# Patient Record
Sex: MALE | Race: WHITE | Employment: FULL TIME | URBAN - METROPOLITAN AREA
[De-identification: names, ages, dates, MRNs, and addresses within clinical notes are randomized per-mention and may not be internally consistent; named-entity substitution may affect disease eponyms.]

---

## 2017-06-25 ENCOUNTER — HOSPITAL ENCOUNTER (EMERGENCY)
Age: 42
Discharge: HOME | End: 2017-06-25
Attending: EMERGENCY MEDICINE
Payer: OTHER GOVERNMENT

## 2017-06-25 VITALS
HEART RATE: 67 BPM | TEMPERATURE: 97.7 F | OXYGEN SATURATION: 98 % | WEIGHT: 200 LBS | HEIGHT: 69 IN | BODY MASS INDEX: 29.62 KG/M2 | RESPIRATION RATE: 18 BRPM | DIASTOLIC BLOOD PRESSURE: 59 MMHG | SYSTOLIC BLOOD PRESSURE: 104 MMHG

## 2017-06-25 DIAGNOSIS — M51.36 DEGENERATIVE DISC DISEASE, LUMBAR: ICD-10-CM

## 2017-06-25 DIAGNOSIS — S39.012A LUMBAR STRAIN, INITIAL ENCOUNTER: Primary | ICD-10-CM

## 2017-06-25 PROCEDURE — 74011250637 HC RX REV CODE- 250/637: Performed by: EMERGENCY MEDICINE

## 2017-06-25 PROCEDURE — 74011250636 HC RX REV CODE- 250/636: Performed by: EMERGENCY MEDICINE

## 2017-06-25 PROCEDURE — 96372 THER/PROPH/DIAG INJ SC/IM: CPT

## 2017-06-25 PROCEDURE — 99283 EMERGENCY DEPT VISIT LOW MDM: CPT

## 2017-06-25 RX ORDER — CYCLOBENZAPRINE HCL 10 MG
10 TABLET ORAL
Qty: 12 TAB | Refills: 0 | Status: SHIPPED | OUTPATIENT
Start: 2017-06-25

## 2017-06-25 RX ORDER — METHOCARBAMOL 500 MG/1
750 TABLET, FILM COATED ORAL
Status: DISCONTINUED | OUTPATIENT
Start: 2017-06-25 | End: 2017-06-25

## 2017-06-25 RX ORDER — MONTELUKAST SODIUM 10 MG/1
10 TABLET ORAL DAILY
COMMUNITY

## 2017-06-25 RX ORDER — PANTOPRAZOLE SODIUM 40 MG/1
40 TABLET, DELAYED RELEASE ORAL DAILY
COMMUNITY

## 2017-06-25 RX ORDER — CYCLOBENZAPRINE HCL 10 MG
10 TABLET ORAL
Status: COMPLETED | OUTPATIENT
Start: 2017-06-25 | End: 2017-06-25

## 2017-06-25 RX ORDER — ALBUTEROL SULFATE 90 UG/1
AEROSOL, METERED RESPIRATORY (INHALATION)
COMMUNITY

## 2017-06-25 RX ORDER — CYCLOBENZAPRINE HCL 10 MG
10 TABLET ORAL
COMMUNITY
End: 2017-06-25

## 2017-06-25 RX ORDER — KETOROLAC TROMETHAMINE 30 MG/ML
60 INJECTION, SOLUTION INTRAMUSCULAR; INTRAVENOUS
Status: COMPLETED | OUTPATIENT
Start: 2017-06-25 | End: 2017-06-25

## 2017-06-25 RX ORDER — HYDROCODONE BITARTRATE AND ACETAMINOPHEN 5; 325 MG/1; MG/1
1 TABLET ORAL
Qty: 10 TAB | Refills: 0 | Status: SHIPPED | OUTPATIENT
Start: 2017-06-25

## 2017-06-25 RX ORDER — MELOXICAM 7.5 MG/1
7.5 TABLET ORAL DAILY
COMMUNITY

## 2017-06-25 RX ORDER — HYDROCODONE BITARTRATE AND ACETAMINOPHEN 5; 325 MG/1; MG/1
1 TABLET ORAL ONCE
Status: COMPLETED | OUTPATIENT
Start: 2017-06-25 | End: 2017-06-25

## 2017-06-25 RX ADMIN — KETOROLAC TROMETHAMINE 60 MG: 60 INJECTION, SOLUTION INTRAMUSCULAR at 10:01

## 2017-06-25 RX ADMIN — HYDROCODONE BITARTRATE AND ACETAMINOPHEN 1 TABLET: 5; 325 TABLET ORAL at 10:00

## 2017-06-25 RX ADMIN — CYCLOBENZAPRINE HYDROCHLORIDE 10 MG: 10 TABLET, FILM COATED ORAL at 10:00

## 2017-06-25 NOTE — ED NOTES
I have reviewed discharge instructions with the patient. The patient verbalized understanding. Patient armband removed and shredded. Discharge medications reviewed with patient and appropriate educational materials and side effects teaching were provided.

## 2017-06-25 NOTE — ED PROVIDER NOTES
HPI Comments: 9:39 AM Arabella Ramos is a 46 y.o. Male with a hx of lumbar DDD who presents to the ED c/o lower back pain that started last night. The pain started after exercising. The pain is worsened with twisting and bending. He tried applying heat to his back with no relief. He has had similar pain in the past due to his DDD. He denies incontinence, trauma, radiation of the pain into the lower extremities, numbness in the lower extremities, fever, and any further complaints. The history is provided by the patient. Past Medical History:   Diagnosis Date    Asthma        Past Surgical History:   Procedure Laterality Date    HX CHOLECYSTECTOMY      HX OTHER SURGICAL      vasectomy    HX OTHER SURGICAL      hernia         History reviewed. No pertinent family history. Social History     Social History    Marital status:      Spouse name: N/A    Number of children: N/A    Years of education: N/A     Occupational History    Not on file. Social History Main Topics    Smoking status: Never Smoker    Smokeless tobacco: Former User     Quit date: 6/25/2017    Alcohol use 1.8 oz/week     3 Cans of beer per week      Comment: per week    Drug use: No    Sexual activity: Yes     Other Topics Concern    Not on file     Social History Narrative    No narrative on file         ALLERGIES: Penicillins    Review of Systems   Constitutional: Negative for fever. HENT: Negative for sore throat. Eyes: Negative for redness and visual disturbance. Respiratory: Negative for shortness of breath and wheezing. Cardiovascular: Negative for chest pain. Gastrointestinal: Negative for abdominal pain and nausea. Endocrine: Negative for polyuria. Genitourinary: Negative for dysuria. Musculoskeletal: Positive for back pain. Negative for arthralgias and neck stiffness. Skin: Negative for rash. Neurological: Negative for numbness and headaches.         Negative for incontinence   All other systems reviewed and are negative. Vitals:    06/25/17 0853   BP: 104/59   Pulse: 67   Resp: 18   Temp: 97.7 °F (36.5 °C)   SpO2: 98%   Weight: 90.7 kg (200 lb)   Height: 5' 9\" (1.753 m)            Physical Exam   Constitutional: He is oriented to person, place, and time. He appears well-developed and well-nourished. No distress. HENT:   Head: Normocephalic and atraumatic. Mouth/Throat: Oropharynx is clear and moist.   Eyes: Conjunctivae are normal. Pupils are equal, round, and reactive to light. No scleral icterus. Neck: Normal range of motion. Neck supple. Cardiovascular: Intact distal pulses. Capillary refill < 3 seconds   Pulmonary/Chest: Effort normal and breath sounds normal. No respiratory distress. He has no wheezes. Abdominal: Soft. Bowel sounds are normal. He exhibits no distension. There is no tenderness. Musculoskeletal: Normal range of motion. He exhibits no edema. Points to bilateral lumbar paraspinal back as the area of pain. No midline tenderness or pain to palpation  Certain movements exacerbate the pain       Lymphadenopathy:     He has no cervical adenopathy. Neurological: He is alert and oriented to person, place, and time. No cranial nerve deficit. Sensation intact  Strength 5/5   Ambulating   Skin: Skin is warm and dry. He is not diaphoretic. Nursing note and vitals reviewed. MDM  Number of Diagnoses or Management Options  Degenerative disc disease, lumbar:   Lumbar strain, initial encounter:   Diagnosis management comments: ddx strain, spasm, DDD    No sign of cord compression  No trauma, I dont feel any imaging needed at this time. Gave analgesia in ED (toradol, flexeril, norco)    I have reassessed the patient. I have discussed the workup, results and plan with the patient and patient is in agreement. Patient will be prescribed norco.  Patient was discharge in stable condition. Patient was given outpatient follow up.   Patient is to return to emergency department if any new or worsening condition. Amount and/or Complexity of Data Reviewed  Tests in the medicine section of CPT®: ordered and reviewed    Risk of Complications, Morbidity, and/or Mortality  Presenting problems: moderate    Patient Progress  Patient progress: improved    ED Course       Procedures  Vitals:  Patient Vitals for the past 12 hrs:   Temp Pulse Resp BP SpO2   06/25/17 0853 97.7 °F (36.5 °C) 67 18 104/59 98 %     Pulse ox reviewed and WNL    Medications ordered:   Medications   ketorolac tromethamine (TORADOL) 60 mg/2 mL injection 60 mg (not administered)   HYDROcodone-acetaminophen (NORCO) 5-325 mg per tablet 1 Tab (not administered)   cyclobenzaprine (FLEXERIL) tablet 10 mg (not administered)         Progress notes, Consult notes or additional Procedure notes:   9:45 AM  Discussed findings, as well as, diagnosis and plan for discharge. Pt verbalizes understanding and agreement with plan. All questions addressed at this time. Disposition:  Diagnosis:   1. Lumbar strain, initial encounter    2. Degenerative disc disease, lumbar        Disposition: Discharge    Follow-up Information     Follow up With Details Comments Contact Info    Your regular doctor in [place]. Call in 2 days      [facility]  As needed, If symptoms worsen [address]            Patient's Medications   Start Taking    HYDROCODONE-ACETAMINOPHEN (NORCO) 5-325 MG PER TABLET    Take 1 Tab by mouth every six (6) hours as needed for Pain. Max Daily Amount: 4 Tabs. Continue Taking    ALBUTEROL (PROVENTIL HFA, VENTOLIN HFA, PROAIR HFA) 90 MCG/ACTUATION INHALER    Take  by inhalation every six (6) hours as needed for Wheezing. MELOXICAM (MOBIC) 7.5 MG TABLET    Take 7.5 mg by mouth daily. MONTELUKAST (SINGULAIR) 10 MG TABLET    Take 10 mg by mouth daily. PANTOPRAZOLE (PROTONIX) 40 MG TABLET    Take 40 mg by mouth daily.    These Medications have changed    Modified Medication Previous Medication    CYCLOBENZAPRINE (FLEXERIL) 10 MG TABLET cyclobenzaprine (FLEXERIL) 10 mg tablet       Take 1 Tab by mouth three (3) times daily as needed for Muscle Spasm(s). Indications: MUSCLE SPASM    Take 10 mg by mouth three (3) times daily as needed for Muscle Spasm(s). Stop Taking    No medications on file         SCRIBE ATTESTATION STATEMENT  Documented by: Magnolia Zimmerman for, and in the presence of, Gurmeet Frank DO 9:45 AM    Signed by: Joanna Stanley, 06/25/17 9:45 AM    PROVIDER ATTESTATION STATEMENT  I personally performed the services described in the documentation, reviewed the documentation, as recorded by the scribe in my presence, and it accurately and completely records my words and actions.   Gurmeet Frank DO

## 2017-06-25 NOTE — LETTER
[address] [place] [address] [facility] 
[address] [facility] [id number] 
[phone] Work/School Note Date: 6/25/2017 To Whom It May concern: 
 
Zhao Singh was seen and treated today in the emergency room by the following provider(s): 
Attending Provider: Jonathan Smith Special Instructions:  Bedrest for today 6/25/17 and light duty on 06/26/2017. Sincerely, Leon Sanchez RN

## 2017-06-25 NOTE — ED TRIAGE NOTES
Pt. C/o lower back pain since yest.  has hx of same and has been treated in past with flexeril and percocet.  is here on reserve duty. States last flare up was one year ago.  Has DJD

## 2017-06-25 NOTE — DISCHARGE INSTRUCTIONS
Learning About Degenerative Disc Disease  What is degenerative disc disease? Degenerative disc disease is not really a disease. It's a term used to describe the normal changes in your spinal discs as you age. Spinal discs are small, spongy discs that separate the bones (vertebrae) that make up the spine. The discs act as shock absorbers for the spine, so it can flex, bend, and twist.  Degenerative disc disease can take place in one or more places along the spine. It most often occurs in the discs in the lower back and the neck. What causes it? As we age, our spinal discs break down, or degenerate. This breakdown causes the symptoms of degenerative disc disease in some people. When the discs break down, they can lose fluid and dry out, and their outer layers can have tiny cracks or tears. This leads to less padding and less space between the bones in the spine. The body reacts to this by making bony growths on the spine called bone spurs. These spurs can press on the spinal nerve roots or spinal cord. This can cause pain and can affect how well the nerves work. What are the symptoms? Many people with degenerative disc disease have no pain. But others have severe pain or other symptoms that limit their activities. Some of the most common symptoms are:  · Pain in the back or neck. Where the pain occurs depends on which discs are affected. · Pain that gets worse when you move, such as bending over, reaching up, or twisting. · Pain that may occur in the rear end (buttocks), arm, or leg if a nerve is pinched. · Numbness or tingling in your arm or leg. How is it diagnosed? A doctor can often diagnose degenerative disc disease while doing a physical exam. If your exam shows no signs of a serious condition, imaging tests (such as an X-ray) aren't likely to help your doctor find the cause of your symptoms.   Sometimes degenerative disc disease is found when an X-ray is taken for another reason, such as an injury or other health problem. But even if the doctor finds degenerative disc disease, that doesn't always mean that you will have symptoms. How is it treated? There are several things you can do at home to manage pain from this problem. · To relieve pain, use ice or heat (whichever feels better) on the affected area. ¨ Put ice or a cold pack on the area for 10 to 20 minutes at a time. Put a thin cloth between the ice and your skin. ¨ Put a warm water bottle, a heating pad set on low, or a warm cloth on your back. Put a thin cloth between the heating pad and your skin. Do not go to sleep with a heating pad on your skin. · Ask your doctor if you can take acetaminophen (such as Tylenol) or nonsteroidal anti-inflammatory drugs, such as ibuprofen or naproxen. Your doctor can prescribe stronger medicines if needed. Be safe with medicines. Read and follow all instructions on the label. · Get some exercise every day. Exercise is one of the best ways to help your back feel better and stay better. It's best to start each exercise slowly. You may notice a little soreness, and that's okay. But if an exercise makes your pain worse, stop doing it. Here are things you can try:  ¨ Walking. It's the simplest and maybe the best activity for your back. It gets your blood moving and helps your muscles stay strong. ¨ Exercises that gently stretch and strengthen your stomach, back, and leg muscles. The stronger those muscles are, the better they're able to protect your back. If you have constant or severe pain in your back or spine, you may need other treatments, such as physical therapy. In some cases, your doctor may suggest surgery. Follow-up care is a key part of your treatment and safety. Be sure to make and go to all appointments, and call your doctor if you are having problems. It's also a good idea to know your test results and keep a list of the medicines you take. Where can you learn more?   Go to http://jayde-zainab.info/. Enter S800 in the search box to learn more about \"Learning About Degenerative Disc Disease. \"  Current as of: March 21, 2017  Content Version: 11.3  © 4895-0999 [employer]. Care instructions adapted under license by [employer] (which disclaims liability or warranty for this information). If you have questions about a medical condition or this instruction, always ask your healthcare professional. [place] [address] any warranty or liability for your use of this information. Back Strain: Care Instructions  Your Care Instructions    Back strain happens when you overstretch, or pull, a muscle in your back. You may hurt your back in an accident or when you exercise or lift something. Most back pain will get better with rest and time. You can take care of yourself at home to help your back heal.  Follow-up care is a key part of your treatment and safety. Be sure to make and go to all appointments, and call your doctor if you are having problems. It's also a good idea to know your test results and keep a list of the medicines you take. How can you care for yourself at home? · Try to stay as active as you can, but stop or reduce any activity that causes pain. · Put ice or a cold pack on the sore muscle for 10 to 20 minutes at a time to stop swelling. Try this every 1 to 2 hours for 3 days (when you are awake) or until the swelling goes down. Put a thin cloth between the ice pack and your skin. · After 2 or 3 days, apply a heating pad on low or a warm cloth to your back. Some doctors suggest that you go back and forth between hot and cold treatments. · Take pain medicines exactly as directed. ¨ If the doctor gave you a prescription medicine for pain, take it as prescribed. ¨ If you are not taking a prescription pain medicine, ask your doctor if you can take an over-the-counter medicine.   · Try sleeping on your side with a pillow between your legs. Or put a pillow under your knees when you lie on your back. These measures can ease pain in your lower back. · Return to your usual level of activity slowly. When should you call for help? Call 911 anytime you think you may need emergency care. For example, call if:  · You are unable to move a leg at all. Call your doctor now or seek immediate medical care if:  · You have new or worse symptoms in your legs, belly, or buttocks. Symptoms may include:  ¨ Numbness or tingling. ¨ Weakness. ¨ Pain. · You lose bladder or bowel control. Watch closely for changes in your health, and be sure to contact your doctor if you are not getting better as expected. Where can you learn more? Go to http://jayde-zainab.info/. Enter P460 in the search box to learn more about \"Back Strain: Care Instructions. \"  Current as of: March 21, 2017  Content Version: 11.3  © 8338-3041 [employer]. Care instructions adapted under license by [employer] (which disclaims liability or warranty for this information). If you have questions about a medical condition or this instruction, always ask your healthcare professional. [place] [address] any warranty or liability for your use of this information.